# Patient Record
Sex: FEMALE | Race: WHITE | NOT HISPANIC OR LATINO | Employment: FULL TIME | URBAN - METROPOLITAN AREA
[De-identification: names, ages, dates, MRNs, and addresses within clinical notes are randomized per-mention and may not be internally consistent; named-entity substitution may affect disease eponyms.]

---

## 2018-06-11 ENCOUNTER — OFFICE VISIT (OUTPATIENT)
Dept: URGENT CARE | Facility: MEDICAL CENTER | Age: 33
End: 2018-06-11
Payer: COMMERCIAL

## 2018-06-11 VITALS
HEIGHT: 64 IN | HEART RATE: 96 BPM | BODY MASS INDEX: 23.63 KG/M2 | SYSTOLIC BLOOD PRESSURE: 110 MMHG | OXYGEN SATURATION: 100 % | RESPIRATION RATE: 20 BRPM | DIASTOLIC BLOOD PRESSURE: 70 MMHG | WEIGHT: 138.38 LBS | TEMPERATURE: 98.9 F

## 2018-06-11 DIAGNOSIS — M54.41 ACUTE BILATERAL LOW BACK PAIN WITH BILATERAL SCIATICA: Primary | ICD-10-CM

## 2018-06-11 DIAGNOSIS — M54.42 ACUTE BILATERAL LOW BACK PAIN WITH BILATERAL SCIATICA: Primary | ICD-10-CM

## 2018-06-11 PROCEDURE — G0382 LEV 3 HOSP TYPE B ED VISIT: HCPCS | Performed by: FAMILY MEDICINE

## 2018-06-11 RX ORDER — PREDNISONE 10 MG/1
TABLET ORAL
Qty: 36 TABLET | Refills: 0 | Status: SHIPPED | OUTPATIENT
Start: 2018-06-11 | End: 2018-11-29 | Stop reason: ALTCHOICE

## 2018-06-11 RX ORDER — METHOCARBAMOL 500 MG/1
TABLET, FILM COATED ORAL
Qty: 40 TABLET | Refills: 1 | Status: SHIPPED | OUTPATIENT
Start: 2018-06-11 | End: 2018-11-29 | Stop reason: ALTCHOICE

## 2018-06-11 NOTE — PROGRESS NOTES
[facility]        NAME: Malik Craven is a 28 y o  female  : 1985    MRN: [id number]  DATE: 2018  TIME: 12:03 PM    Assessment and Plan   Acute bilateral low back pain with bilateral sciatica [M54 42, M54 41]  1  Acute bilateral low back pain with bilateral sciatica  predniSONE 10 mg tablet    methocarbamol (ROBAXIN) 500 mg tablet         Patient Instructions     Take prednisone taper as directed  Take with food to avoid GI upset  Take robaxin as directed  Continue moist heat to the area, gentle stretching as tolerated  Follow up with PCP in 3-5 days  Proceed to  ER if symptoms worsen  Chief Complaint     Chief Complaint   Patient presents with    Back Pain     x5days         History of Present Illness        This is a 25-year-old female presenting for low back pain times 5 days  She denies any injury to the area  She has history of low back issues  The pain started with the bilateral lumbar pain, now she has shooting pains into both of her legs and pain radiating up her thoracic spine  She endorses intermittent numbness and tingling of her hands, no numbness or tingling in the legs  No loss of bowel or bladder control, incontinence, saddle anesthesia  Pain is made worse with changing positions  She has been using heating pad, Biofreeze, ibuprofen which is not helping  Back Pain   This is a new problem  The current episode started in the past 7 days  The problem occurs constantly  The problem has been gradually worsening since onset  The pain is present in the lumbar spine  The pain radiates to the right foot and left foot  The pain is moderate  The pain is the same all the time  The symptoms are aggravated by bending and position  Associated symptoms include numbness and tingling   Pertinent negatives include no abdominal pain, bladder incontinence, bowel incontinence, chest pain, dysuria, fever, headaches, leg pain, paresis, paresthesias, pelvic pain, perianal numbness, weakness or weight loss  She has tried analgesics and heat for the symptoms  The treatment provided no relief  Review of Systems   Review of Systems   Constitutional: Negative for fever and weight loss  Respiratory: Negative for shortness of breath  Cardiovascular: Negative for chest pain  Gastrointestinal: Negative for abdominal pain and bowel incontinence  Genitourinary: Negative for bladder incontinence, dysuria and pelvic pain  Musculoskeletal: Positive for back pain, gait problem, myalgias and neck stiffness  Negative for arthralgias, joint swelling and neck pain  Skin: Negative for wound  Neurological: Positive for tingling and numbness  Negative for weakness, headaches and paresthesias  Current Medications       Current Outpatient Prescriptions:     methocarbamol (ROBAXIN) 500 mg tablet, Take 1-2 tabs up to 4 times daily as needed for muscle spasm , Disp: 40 tablet, Rfl: 1    predniSONE 10 mg tablet, 60 mg x 1 day, 50 mg x 2 days, 40 mg x 2 days, 30 mg x 2 days, 20 mg x 2 days, 10 mg x 2 days, Disp: 36 tablet, Rfl: 0    Current Allergies     Allergies as of 06/11/2018    (No Known Allergies)            The following portions of the patient's history were reviewed and updated as appropriate: allergies, current medications, past family history, past medical history, past social history, past surgical history and problem list      History reviewed  No pertinent past medical history  Past Surgical History:   Procedure Laterality Date    HYSTERECTOMY         No family history on file  Medications have been verified  Objective   /70   Pulse 96   Temp 98 9 °F (37 2 °C) (Temporal)   Resp 20   Ht 5' 4" (1 626 m)   Wt 62 8 kg (138 lb 6 oz)   SpO2 100%   BMI 23 75 kg/m²        Physical Exam     Physical Exam   Constitutional: She appears well-developed and well-nourished  No distress  HENT:   Head: Normocephalic and atraumatic     Nose: Nose normal  Eyes: Conjunctivae and EOM are normal  Pupils are equal, round, and reactive to light  Cardiovascular: Normal rate, regular rhythm and normal heart sounds  Pulmonary/Chest: Effort normal and breath sounds normal  No respiratory distress  She has no wheezes  She has no rales  Musculoskeletal:     Tenderness to palpation throughout the thoracic and lumbar regions bilaterally  Tenderness to bilateral SI joints  Range of motion limited in full forward flexion  5/5 strength bilateral upper and lower extremities  Sensation intact, 3+ radial pulses  No edema, ecchymosis, overlying skin changes  Negative straight leg raise bilaterally   Neurological: She is alert  Skin: Skin is warm and dry  She is not diaphoretic  Nursing note and vitals reviewed

## 2018-06-11 NOTE — PATIENT INSTRUCTIONS
Take prednisone taper as directed  Take with food to avoid GI upset  Take robaxin as directed  Continue moist heat to the area, gentle stretching as tolerated  Follow up with a PCP for persistent symptoms  Go to the ER for any distress  Acute Low Back Pain   AMBULATORY CARE:   Acute low back pain  is sudden discomfort in your lower back area that lasts for up to 6 weeks  The discomfort makes it difficult to tolerate activity  Common symptoms include the following:   · Back stiffness or spasms    · Pain down the back or side of one leg    · Holding yourself in an unusual position or posture to decrease your back pain    · Not being able to find a sitting position that is comfortable    · Slow increase in your pain for 24 to 48 hours after you stress your back    · Tenderness on your lower back or severe pain when you move your back  Seek immediate care for the following symptoms:   · Severe pain    · Sudden stiffness and heaviness in both buttocks down to both legs    · Numbness or weakness in one leg, or pain in both legs    · Numbness in your genital area or across your lower back    · Unable to control your urine or bowel movements  Contact your healthcare provider if:   · You have a fever  · You have pain at night or when you rest     · Your pain does not get better with treatment  · You have pain that worsens when you cough or sneeze  · You suddenly feel something pop or snap in your back  · You have questions or concerns about your condition or care  The goal of treatment for acute low back pain  is to relieve your pain and help you tolerate activity  Most people with acute lower back pain get better within 4 to 6 weeks  You may need any of the following:  · Acetaminophen  decreases pain  It is available without a doctor's order  Ask how much to take and how often to take it  Follow directions  Acetaminophen can cause liver damage if not taken correctly      · NSAIDs  help decrease swelling and pain  This medicine is available with or without a doctor's order  NSAIDs can cause stomach bleeding or kidney problems in certain people  If you take blood thinner medicine, always ask your healthcare provider if NSAIDs are safe for you  Always read the medicine label and follow directions  · Prescription pain medicine  may be given  Ask your healthcare provider how to take this medicine safely  · Muscle relaxers  decrease pain by relaxing the muscles in your lower spine  Manage your symptoms:   · Stay active  as much as you can without causing more pain  Bed rest could make your back pain worse  Start with some light exercises such as walking  Avoid heavy lifting until your pain is gone  Ask for more information about the activities or exercises that are right for you  · Ice  helps decrease swelling, pain, and muscle spams  Put crushed ice in a plastic bag  Cover it with a towel  Place it on your lower back for 20 to 30 minutes every 2 hours  Do this for about 2 to 3 days after your pain starts, or as directed  · Heat  helps decrease pain and muscle spasms  Start to use heat after treatment with ice has stopped  Use a small towel dampened with warm water or a heating pad, or sit in a warm bath  Apply heat on the area for 20 to 30 minutes every 2 hours for as many days as directed  Alternate heat and ice  Prevent acute low back pain:   · Use proper body mechanics  ¨ Bend at the hips and knees when you  objects  Do not bend from the waist  Use your leg muscles as you lift the load  Do not use your back  Keep the object close to your chest as you lift it  Try not to twist or lift anything above your waist     ¨ Change your position often when you stand for long periods of time  Rest one foot on a small box or footrest, and then switch to the other foot often  ¨ Try not to sit for long periods of time   When you do, sit in a straight-backed chair with your feet flat on the floor  Never reach, pull, or push while you are sitting  · Do exercises that strengthen your back muscles  Warm up before you exercise  Ask your healthcare provider the best exercises for you  · Maintain a healthy weight  Ask your healthcare provider how much you should weigh  Ask him to help you create a weight loss plan if you are overweight  Follow up with your healthcare provider as directed:  Return for a follow-up visit if you still have pain after 1 to 3 weeks of treatment  You may need to visit an orthopedist if your back pain lasts more than 12 weeks  Write down your questions so you remember to ask them during your visits  © 2017 [employer] Information is for End User's use only and may not be sold, redistributed or otherwise used for commercial purposes  All illustrations and images included in CareNotes® are the copyrighted property of [employer]  or Jair Branham  The above information is an  only  It is not intended as medical advice for individual conditions or treatments  Talk to your doctor, nurse or pharmacist before following any medical regimen to see if it is safe and effective for you

## 2018-06-11 NOTE — PROGRESS NOTES
Started 5 days ago with low back pain that radiates into both legs  "feels like a pinched nerve"  Pain now starting to radiate up to middle of back and into neck  Using motrin , heating pad and biofreeze

## 2018-11-29 ENCOUNTER — OFFICE VISIT (OUTPATIENT)
Dept: OBGYN CLINIC | Facility: CLINIC | Age: 33
End: 2018-11-29
Payer: COMMERCIAL

## 2018-11-29 VITALS
RESPIRATION RATE: 16 BRPM | SYSTOLIC BLOOD PRESSURE: 104 MMHG | HEART RATE: 68 BPM | WEIGHT: 140 LBS | DIASTOLIC BLOOD PRESSURE: 80 MMHG | TEMPERATURE: 99 F | BODY MASS INDEX: 24.03 KG/M2

## 2018-11-29 DIAGNOSIS — R10.2 PELVIC PAIN IN FEMALE: ICD-10-CM

## 2018-11-29 DIAGNOSIS — Z01.419 ENCOUNTER FOR GYNECOLOGICAL EXAMINATION (GENERAL) (ROUTINE) WITHOUT ABNORMAL FINDINGS: Primary | ICD-10-CM

## 2018-11-29 PROCEDURE — S0610 ANNUAL GYNECOLOGICAL EXAMINA: HCPCS | Performed by: PHYSICIAN ASSISTANT

## 2018-11-29 RX ORDER — NICOTINE POLACRILEX 2 MG
GUM BUCCAL
COMMUNITY

## 2018-11-29 NOTE — ASSESSMENT & PLAN NOTE
The current ASCCP guidelines were reviewed  Patient's last pap was 10/2017 - WNL per patient and patient has no history of abnormal paps, therefore, cervical cancer screening is no longer indicated at this time  I emphasized the importance of an annual pelvic and breast exam  Patient ok to defer pap today

## 2018-11-29 NOTE — PROGRESS NOTES
Assessment/Plan   Diagnoses and all orders for this visit:    Encounter for gynecological examination (general) (routine) without abnormal findings  The current ASCCP guidelines were reviewed  Patient's last pap was 10/2017 - WNL per patient and patient has no history of abnormal paps, therefore, cervical cancer screening is no longer indicated at this time  I emphasized the importance of an annual pelvic and breast exam  Patient ok to defer pap today  Pelvic pain in female  -     Ambulatory referral to Physical Therapy; Future    Discussion  I have discussed the importance of monthly self-breast exams, exercise and healthy diet as well as adequate intake of calcium and vitamin D  Encourage MVI q day and r/alka importance of folic acid; Encourage 30-40 min weight bearing exercise most days of week  Encourage safe sexual practices; STD testing - declines  Contraception - none s/p UDAY  Breast cancer screening is not indicated at this time  Reviewed possible baseline mammogram between 28-36 yr old per patient request - recommend checking with insurance for coverage, otherwise, routine screening initiated at 36 yrs old  Reviewed pelvic floor discomfort, especially at perineum with IC - encourage perineal stretching if comfortable with a plain lubricant or coconut oil  encourage vaginal moisturizers like luvena or replense/coconut oil or crisco applied into vagina couple times/week and the use of plain lubricant with IC  Offered consultation with pelvic floor physical therapy and briefly reviewed treatment/benefits  Information given to patient to call to schedule if desires    All questions have been answered to her satisfaction  RTO for APE or sooner if needed      Subjective     HPI   Kandi Garcia is a 28 y o  female who presents for annual well woman exam    Menarche - 14; LMP - 10/2016 - patient s/p UDAY with prolapse repair/ovaries remain - surgery performed due to significant bladder and rectal prolapse - per patient no mesh used - repaired with own tissue; Denies any vaginal bleeding  No vulvar itch/burn; No vaginal itch/burn; No abn discharge or odor; No urinary sx - burning/pain/frequency/hematuria  (+) SBEs - no breast masses, asymmetry, nipple discharge or bleeding, changes in skin of breast, or breast tenderness bilaterally - occasionally feels left breat tenderness  No abd/pelvic pain or HAs; (+) problems with intercourse/vaginal dryness - since birth of last daughter and hysterectomy/prolapse repair - perineum has been torn/cut 4 times - experiences a lot of discomfort with IC - utilized lubricants with some help; No menopausal symptoms: No hot flashes/night sweats; sleeping well;   Pt is sexually active in a mutually monog/ sexual relationship; No issues with intercourse; She declines std/hiv/hep testing; Feels safe at home  Current contraception: hysterectomy  (-) PCP for routine Bw/care; Last Pap - 10/2017 - WNL per patient  History of abnormal Pap smear: no    Review of Systems   Constitutional: Negative for activity change, fatigue, fever and unexpected weight change  HENT: Negative for congestion, dental problem, sinus pain and sinus pressure  Eyes: Negative for visual disturbance  Respiratory: Negative for cough, shortness of breath and wheezing  Cardiovascular: Negative for chest pain and leg swelling  Gastrointestinal: Negative for abdominal distention, abdominal pain, blood in stool, constipation, diarrhea, nausea and vomiting  Endocrine: Negative for polydipsia  Genitourinary: Negative for difficulty urinating, dyspareunia, dysuria, frequency, hematuria, menstrual problem, pelvic pain, urgency, vaginal bleeding, vaginal discharge and vaginal pain  Musculoskeletal: Negative for arthralgias and back pain  Allergic/Immunologic: Negative for environmental allergies  Neurological: Negative for dizziness, seizures and headaches     Psychiatric/Behavioral: Negative for dysphoric mood and sleep disturbance  The patient is not nervous/anxious  The following portions of the patient's history were reviewed and updated as appropriate: allergies, current medications, past family history, past medical history, past social history, past surgical history and problem list          OB History      Para Term  AB Living    4 3     1      SAB TAB Ectopic Multiple Live Births    1                Obstetric Comments    : 45  F; 2006 SAB naturally;   F;   F          Past Medical History:   Diagnosis Date    Endometriosis        Past Surgical History:   Procedure Laterality Date    HYSTERECTOMY  10/2016    UDAY with prolapse repair/ovaries remain - due to bladder and rectal prolapse       Family History   Problem Relation Age of Onset    Diabetes Mother     Hypertension Mother     Diabetes Sister     Breast cancer additional onset Maternal Grandmother             Breast cancer additional onset Paternal Grandmother                Social History     Social History    Marital status: /Civil Union     Spouse name: N/A    Number of children: N/A    Years of education: N/A     Occupational History    Not on file       Social History Main Topics    Smoking status: Never Smoker    Smokeless tobacco: Never Used    Alcohol use 1 2 oz/week     2 Standard drinks or equivalent per week    Drug use: No    Sexual activity: Yes     Partners: Male     Birth control/ protection: Female Sterilization     Other Topics Concern    Not on file     Social History Narrative    No narrative on file         Current Outpatient Prescriptions:     Biotin 1 MG CAPS, Take by mouth, Disp: , Rfl:     Allergies   Allergen Reactions    Amoxicillin Hives       Objective   Vitals:    18 0927   BP: 104/80   BP Location: Left arm   Patient Position: Sitting   Cuff Size: Standard   Pulse: 68   Resp: 16   Temp: 99 °F (37 2 °C)   TempSrc: Tympanic   Weight: 63 5 kg (140 lb)     Physical Exam   Constitutional: She appears well-developed and well-nourished  No distress  Neck: No thyromegaly present  Cardiovascular: Normal rate, regular rhythm and normal heart sounds  No murmur heard  Pulmonary/Chest: Effort normal and breath sounds normal  No respiratory distress  She has no wheezes  Right breast exhibits no inverted nipple, no mass, no nipple discharge, no skin change and no tenderness  Left breast exhibits no inverted nipple, no mass, no nipple discharge, no skin change and no tenderness  Breasts are symmetrical    Abdominal: Soft  She exhibits no distension and no mass  There is no tenderness  Genitourinary: Vagina normal  Pelvic exam was performed with patient supine  There is no rash, tenderness or lesion on the right labia  There is no rash, tenderness or lesion on the left labia  Right adnexum displays no mass, no tenderness and no fullness  Left adnexum displays no mass, no tenderness and no fullness  No erythema, tenderness or bleeding in the vagina  No foreign body in the vagina  No vaginal discharge found  Genitourinary Comments: Uterus, cervix and fallopian tubes surgically absent; No significant vaginal atrophy or dryness appreciated - moderate amount of vaginal discharge noted and likely physiologic  Can appreciate scar down perineum  Musculoskeletal: She exhibits no edema  Lymphadenopathy:     She has no cervical adenopathy  Right: No inguinal adenopathy present  Left: No inguinal adenopathy present  Neurological: She is alert  Skin: Skin is warm  She is not diaphoretic  Psychiatric: She has a normal mood and affect  Her behavior is normal    Vitals reviewed

## 2019-09-04 PROBLEM — I83.813 VARICOSE VEINS OF BILATERAL LOWER EXTREMITIES WITH PAIN: Status: ACTIVE | Noted: 2019-09-04

## 2019-09-04 NOTE — PROGRESS NOTES
Assessment/Plan:    Varicose veins of bilateral lower extremities with pain  Symptomatic varicose veins to bilateral lower extremities with pain/aching/cramping  We discussed the pathophysiology of venous disease  She will trail a course of conservative therapy to include the daily use of 20-30 mmHg gradient compression stockings  We discussed the benefits of frequent elevation, regular physical activity and weight management  After a 90 day trial of conservative therapy she will have venous duplex to evaluate for insufficieny  Followup with vascular surgeon after imaging to review test results and response to conservative measures  Diagnoses and all orders for this visit:    Varicose veins of bilateral lower extremities with pain  -     VAS reflux lower limb venous duplex study with reflux assessment, complete bilateral; Future  -     Compression Stocking          Subjective:      Patient ID: Janice Hubbard is a 27 y o  female  Patient is new to our practice  She has no recent testing  She presents with FAIZA leg pain with bulging veins for the past about 5 years started during pregnancy  She complains of cramping in legs especially while sitting  No swelling  Patient exercises regularly  Patient does not currently wear compression regularly but has worn it in the past       Patient seen for evaluation of varicose veins  She has visible vein to left thigh and varicosities to right thigh and calf  She reports worsening chronic daily aching and cramping pain to both legs  Pain occurs after walking/standing for more than 20 minutes  Pain is worse after driving long distances  She is unable to identify relieving factors  She denies edema  She does not wear compression stockings  She does not elevate  No tissue loss/skin changes          The following portions of the patient's history were reviewed and updated as appropriate: allergies, current medications, past family history, past medical history, past social history, past surgical history and problem list     Review of Systems   Constitutional: Negative  HENT: Negative  Eyes: Negative  Respiratory: Negative  Cardiovascular: Negative for leg swelling  Gastrointestinal: Negative  Endocrine: Negative  Genitourinary: Negative  Musculoskeletal: Negative  Leg pain   Skin: Negative  Allergic/Immunologic: Negative  Neurological: Negative  Hematological: Negative  Psychiatric/Behavioral: Negative  Objective:       Physical Exam   Constitutional: She is oriented to person, place, and time  She appears well-nourished  No distress  HENT:   Head: Normocephalic and atraumatic  Eyes: No scleral icterus  Neck: Neck supple  No JVD present  Cardiovascular: Normal rate and regular rhythm  Pulses:       Dorsalis pedis pulses are 2+ on the right side, and 2+ on the left side  Posterior tibial pulses are 2+ on the left side  Varicose vein right calf and proximal thigh  Visible vein left thigh   Pulmonary/Chest: Effort normal  No respiratory distress  Abdominal: Soft  She exhibits no distension  Musculoskeletal: She exhibits no edema  Neurological: She is alert and oriented to person, place, and time  Skin: Skin is warm and dry  Psychiatric: She has a normal mood and affect  I have reviewed and made appropriate changes to the review of systems input by the medical assistant      Vitals:    09/05/19 0907   BP: 110/60   BP Location: Right arm   Patient Position: Sitting   Pulse: 64   Resp: 18   Temp: 98 1 °F (36 7 °C)   Weight: 57 6 kg (127 lb)   Height: 5' 4" (1 626 m)       Patient Active Problem List   Diagnosis    Encounter for gynecological examination (general) (routine) without abnormal findings    Varicose veins of bilateral lower extremities with pain       Past Surgical History:   Procedure Laterality Date    HYSTERECTOMY  10/2016    UDAY with prolapse repair/ovaries remain - due to bladder and rectal prolapse       Family History   Problem Relation Age of Onset    Diabetes Mother     Hypertension Mother     Diabetes Sister     Breast cancer additional onset Maternal Grandmother             Breast cancer additional onset Paternal Grandmother                Social History     Socioeconomic History    Marital status: /Civil Union     Spouse name: Not on file    Number of children: Not on file    Years of education: Not on file    Highest education level: Not on file   Occupational History    Not on file   Social Needs    Financial resource strain: Not on file    Food insecurity:     Worry: Not on file     Inability: Not on file    Transportation needs:     Medical: Not on file     Non-medical: Not on file   Tobacco Use    Smoking status: Never Smoker    Smokeless tobacco: Never Used   Substance and Sexual Activity    Alcohol use:  Yes     Alcohol/week: 2 0 standard drinks     Types: 2 Standard drinks or equivalent per week    Drug use: No    Sexual activity: Yes     Partners: Male     Birth control/protection: Female Sterilization   Lifestyle    Physical activity:     Days per week: Not on file     Minutes per session: Not on file    Stress: Not on file   Relationships    Social connections:     Talks on phone: Not on file     Gets together: Not on file     Attends [employer] service: Not on file     Active member of club or organization: Not on file     Attends meetings of clubs or organizations: Not on file     Relationship status: Not on file    Intimate partner violence:     Fear of current or ex partner: Not on file     Emotionally abused: Not on file     Physically abused: Not on file     Forced sexual activity: Not on file   Other Topics Concern    Not on file   Social History Narrative    Not on file       Allergies   Allergen Reactions    Amoxicillin Hives         Current Outpatient Medications:     Biotin 1 MG CAPS, Take by mouth, Disp: , Rfl:

## 2019-09-04 NOTE — ASSESSMENT & PLAN NOTE
Symptomatic varicose veins to bilateral lower extremities with pain/aching/cramping  We discussed the pathophysiology of venous disease  She will trail a course of conservative therapy to include the daily use of 20-30 mmHg gradient compression stockings  We discussed the benefits of frequent elevation, regular physical activity and weight management  After a 90 day trial of conservative therapy she will have venous duplex to evaluate for insufficieny  Followup with vascular surgeon after imaging to review test results and response to conservative measures

## 2019-09-05 ENCOUNTER — CONSULT (OUTPATIENT)
Dept: VASCULAR SURGERY | Facility: CLINIC | Age: 34
End: 2019-09-05
Payer: COMMERCIAL

## 2019-09-05 VITALS
TEMPERATURE: 98.1 F | HEIGHT: 64 IN | HEART RATE: 64 BPM | RESPIRATION RATE: 18 BRPM | SYSTOLIC BLOOD PRESSURE: 110 MMHG | BODY MASS INDEX: 21.68 KG/M2 | DIASTOLIC BLOOD PRESSURE: 60 MMHG | WEIGHT: 127 LBS

## 2019-09-05 DIAGNOSIS — I83.813 VARICOSE VEINS OF BILATERAL LOWER EXTREMITIES WITH PAIN: Primary | ICD-10-CM

## 2019-09-05 PROCEDURE — 99204 OFFICE O/P NEW MOD 45 MIN: CPT | Performed by: NURSE PRACTITIONER

## 2019-09-05 NOTE — PATIENT INSTRUCTIONS
Symptomatic varicose veins to bilateral lower extremities with pain  -we will initiate a 90 day trial of conservative therapy to include the daily use of 20-30 mmHg gradient knee-high compression stockings    Wear stockings daily during waking hours only, there is no benefit to sleeping in stockings  -other things we discussed that may help manage your symptoms:  Periodic elevation, regular physical activity and maintenance of a healthy weight  -at the 90 day joão you will have a venous duplex to evaluate for venous insufficiency/leaky valves in the veins  -follow up with vascular surgeon after imaging to review results, discuss response to conservative measures and further possible treatment options